# Patient Record
Sex: FEMALE | Race: AMERICAN INDIAN OR ALASKA NATIVE
[De-identification: names, ages, dates, MRNs, and addresses within clinical notes are randomized per-mention and may not be internally consistent; named-entity substitution may affect disease eponyms.]

---

## 2018-07-20 ENCOUNTER — HOSPITAL ENCOUNTER (OUTPATIENT)
Dept: HOSPITAL 5 - SLR | Age: 43
Discharge: HOME | End: 2018-07-20
Attending: OTOLARYNGOLOGY
Payer: MEDICAID

## 2018-07-20 DIAGNOSIS — E66.9: ICD-10-CM

## 2018-07-20 DIAGNOSIS — Z90.710: ICD-10-CM

## 2018-07-20 DIAGNOSIS — G47.33: Primary | ICD-10-CM

## 2018-07-20 DIAGNOSIS — E03.9: ICD-10-CM

## 2018-07-20 PROCEDURE — G0399 HOME SLEEP TEST/TYPE 3 PORTA: HCPCS

## 2020-08-06 ENCOUNTER — OFFICE VISIT (OUTPATIENT)
Dept: URBAN - METROPOLITAN AREA CLINIC 92 | Facility: CLINIC | Age: 45
End: 2020-08-06
Payer: COMMERCIAL

## 2020-08-06 VITALS
HEIGHT: 66 IN | BODY MASS INDEX: 29.96 KG/M2 | HEART RATE: 83 BPM | WEIGHT: 186.4 LBS | SYSTOLIC BLOOD PRESSURE: 120 MMHG | TEMPERATURE: 94.6 F | DIASTOLIC BLOOD PRESSURE: 83 MMHG

## 2020-08-06 DIAGNOSIS — R10.84 DIFFUSE ABDOMINAL PAIN: ICD-10-CM

## 2020-08-06 DIAGNOSIS — K58.1 IRRITABLE BOWEL SYNDROME WITH CONSTIPATION: ICD-10-CM

## 2020-08-06 PROBLEM — 440630006: Status: ACTIVE | Noted: 2020-08-06

## 2020-08-06 PROCEDURE — 99244 OFF/OP CNSLTJ NEW/EST MOD 40: CPT | Performed by: INTERNAL MEDICINE

## 2020-08-06 RX ORDER — LINACLOTIDE 290 UG/1
1 CAPSULE AT LEAST 30 MINUTES BEFORE THE FIRST MEAL OF THE DAY ON AN EMPTY STOMACH CAPSULE, GELATIN COATED ORAL ONCE A DAY
Qty: 30 | OUTPATIENT
Start: 2020-08-06 | End: 2020-09-05

## 2020-08-06 RX ORDER — LEVOTHYROXINE SODIUM 75 UG/1
TABLET ORAL
Qty: 90 UNSPECIFIED | Status: ACTIVE | COMMUNITY

## 2020-08-06 RX ORDER — CHOLECALCIFEROL (VITAMIN D3) 1250 MCG
CAPSULE ORAL
Qty: 12 UNSPECIFIED | Status: ACTIVE | COMMUNITY

## 2020-08-06 RX ORDER — HYDROCHLOROTHIAZIDE 12.5 MG/1
CAPSULE ORAL
Qty: 90 UNSPECIFIED | Status: ACTIVE | COMMUNITY

## 2020-08-06 RX ORDER — PHENTERMINE AND TOPIRAMATE 7.5; 46 MG/1; MG/1
CAPSULE, EXTENDED RELEASE ORAL
Qty: 30 UNSPECIFIED | Status: ACTIVE | COMMUNITY

## 2020-08-06 NOTE — HPI-TODAY'S VISIT:
Referred by Dr. Alexander. Abd pain, diffuse, started 7 months ago but progressive. Intermittent and lasts several hrs. Occurs several times per week. No increasing/decreasing factors. +GERD. No dysphagia, recent wt loss, NSAIDs or GI bleeding. Has chronic constipation and is on miralax daily. BMq3 days. Mild improvement in abd discomfort only. No recent EGD or previous colonoscopy. Is s/p sleeve gastrectomy 2015 and hiatal hernia repair in 2018.  States recent ER visit to [facility] [place] with normal labs and CT abd. No records.

## 2020-08-24 ENCOUNTER — TELEPHONE ENCOUNTER (OUTPATIENT)
Dept: URBAN - METROPOLITAN AREA CLINIC 92 | Facility: CLINIC | Age: 45
End: 2020-08-24

## 2020-08-27 ENCOUNTER — OFFICE VISIT (OUTPATIENT)
Dept: URBAN - METROPOLITAN AREA SURGERY CENTER 16 | Facility: SURGERY CENTER | Age: 45
End: 2020-08-27
Payer: COMMERCIAL

## 2020-08-27 ENCOUNTER — TELEPHONE ENCOUNTER (OUTPATIENT)
Dept: URBAN - METROPOLITAN AREA CLINIC 5 | Facility: CLINIC | Age: 45
End: 2020-08-27

## 2020-08-27 DIAGNOSIS — K29.60 ADENOPAPILLOMATOSIS GASTRICA: ICD-10-CM

## 2020-08-27 DIAGNOSIS — R10.84 ABDOMINAL CRAMPING, GENERALIZED: ICD-10-CM

## 2020-08-27 DIAGNOSIS — K44.9 DIAPHRAGMATIC HERNIA: ICD-10-CM

## 2020-08-27 PROCEDURE — G8907 PT DOC NO EVENTS ON DISCHARG: HCPCS | Performed by: INTERNAL MEDICINE

## 2020-08-27 PROCEDURE — 43239 EGD BIOPSY SINGLE/MULTIPLE: CPT | Performed by: INTERNAL MEDICINE

## 2020-08-27 PROCEDURE — G9937 DIG OR SURV COLSCO: HCPCS | Performed by: INTERNAL MEDICINE

## 2020-08-27 PROCEDURE — 45378 DIAGNOSTIC COLONOSCOPY: CPT | Performed by: INTERNAL MEDICINE

## 2020-08-27 RX ORDER — HYDROCHLOROTHIAZIDE 12.5 MG/1
CAPSULE ORAL
Qty: 90 UNSPECIFIED | Status: ACTIVE | COMMUNITY

## 2020-08-27 RX ORDER — LEVOTHYROXINE SODIUM 75 UG/1
TABLET ORAL
Qty: 90 UNSPECIFIED | Status: ACTIVE | COMMUNITY

## 2020-08-27 RX ORDER — OMEPRAZOLE 40 MG/1
AS DIRECTED CAPSULE, DELAYED RELEASE ORAL BID
Qty: 60 | Refills: 1 | OUTPATIENT
Start: 2020-08-27

## 2020-08-27 RX ORDER — PHENTERMINE AND TOPIRAMATE 7.5; 46 MG/1; MG/1
CAPSULE, EXTENDED RELEASE ORAL
Qty: 30 UNSPECIFIED | Status: ACTIVE | COMMUNITY

## 2020-08-27 RX ORDER — LINACLOTIDE 290 UG/1
1 CAPSULE AT LEAST 30 MINUTES BEFORE THE FIRST MEAL OF THE DAY ON AN EMPTY STOMACH CAPSULE, GELATIN COATED ORAL ONCE A DAY
Qty: 30 | Status: ACTIVE | COMMUNITY
Start: 2020-08-06 | End: 2020-09-05

## 2020-08-27 RX ORDER — CHOLECALCIFEROL (VITAMIN D3) 1250 MCG
CAPSULE ORAL
Qty: 12 UNSPECIFIED | Status: ACTIVE | COMMUNITY

## 2020-08-31 ENCOUNTER — TELEPHONE ENCOUNTER (OUTPATIENT)
Dept: URBAN - METROPOLITAN AREA CLINIC 92 | Facility: CLINIC | Age: 45
End: 2020-08-31

## 2020-09-30 ENCOUNTER — TELEPHONE ENCOUNTER (OUTPATIENT)
Dept: URBAN - METROPOLITAN AREA CLINIC 92 | Facility: CLINIC | Age: 45
End: 2020-09-30

## 2020-09-30 RX ORDER — OMEPRAZOLE 40 MG/1
1 CAPSULE 30 MINUTES BEFORE MORNING MEAL CAPSULE, DELAYED RELEASE ORAL ONCE A DAY
Qty: 90 | Refills: 0 | OUTPATIENT
Start: 2020-09-30

## 2020-10-05 ENCOUNTER — OFFICE VISIT (OUTPATIENT)
Dept: URBAN - METROPOLITAN AREA CLINIC 92 | Facility: CLINIC | Age: 45
End: 2020-10-05
Payer: COMMERCIAL

## 2020-10-05 ENCOUNTER — DASHBOARD ENCOUNTERS (OUTPATIENT)
Age: 45
End: 2020-10-05

## 2020-10-05 VITALS
BODY MASS INDEX: 31.27 KG/M2 | WEIGHT: 194.6 LBS | SYSTOLIC BLOOD PRESSURE: 138 MMHG | HEART RATE: 109 BPM | DIASTOLIC BLOOD PRESSURE: 83 MMHG | HEIGHT: 66 IN | TEMPERATURE: 95.1 F

## 2020-10-05 DIAGNOSIS — K28.9 ANASTOMOTIC ULCER S/P GASTRIC BYPASS: ICD-10-CM

## 2020-10-05 PROBLEM — 447408004: Status: ACTIVE | Noted: 2020-10-05

## 2020-10-05 PROCEDURE — 1036F TOBACCO NON-USER: CPT | Performed by: INTERNAL MEDICINE

## 2020-10-05 PROCEDURE — 99213 OFFICE O/P EST LOW 20 MIN: CPT | Performed by: INTERNAL MEDICINE

## 2020-10-05 PROCEDURE — G8417 CALC BMI ABV UP PARAM F/U: HCPCS | Performed by: INTERNAL MEDICINE

## 2020-10-05 PROCEDURE — G8427 DOCREV CUR MEDS BY ELIG CLIN: HCPCS | Performed by: INTERNAL MEDICINE

## 2020-10-05 RX ORDER — LEVOTHYROXINE SODIUM 75 UG/1
TABLET ORAL
Qty: 90 UNSPECIFIED | Status: ACTIVE | COMMUNITY

## 2020-10-05 RX ORDER — OMEPRAZOLE 40 MG/1
AS DIRECTED CAPSULE, DELAYED RELEASE ORAL BID
Qty: 60 | Refills: 0 | OUTPATIENT
Start: 2020-10-05

## 2020-10-05 RX ORDER — PHENTERMINE AND TOPIRAMATE 7.5; 46 MG/1; MG/1
CAPSULE, EXTENDED RELEASE ORAL
Qty: 30 UNSPECIFIED | Status: ACTIVE | COMMUNITY

## 2020-10-05 RX ORDER — CHOLECALCIFEROL (VITAMIN D3) 1250 MCG
CAPSULE ORAL
Qty: 12 UNSPECIFIED | Status: ACTIVE | COMMUNITY

## 2020-10-05 RX ORDER — OMEPRAZOLE 40 MG/1
1 CAPSULE 30 MINUTES BEFORE MORNING MEAL CAPSULE, DELAYED RELEASE ORAL ONCE A DAY
Qty: 90 | Refills: 0 | Status: ACTIVE | COMMUNITY
Start: 2020-09-30

## 2020-10-05 RX ORDER — OMEPRAZOLE 40 MG/1
AS DIRECTED CAPSULE, DELAYED RELEASE ORAL BID
Qty: 60 | Refills: 1 | Status: ACTIVE | COMMUNITY
Start: 2020-08-27

## 2020-10-05 RX ORDER — HYDROCHLOROTHIAZIDE 12.5 MG/1
CAPSULE ORAL
Qty: 90 UNSPECIFIED | Status: ACTIVE | COMMUNITY

## 2020-10-05 NOTE — HPI-TODAY'S VISIT:
Pt seen for follow up of abd pain. Recent EGD with ulcer/suture material at gastro-jejunal anastomosis. Bx neg for HP. Hx of marcial en y and nissen. Denies nsaids or tobacco. On ppi bid with improvement. No new symptoms. Colonoscopy 8/2020 normal.

## 2022-06-08 ENCOUNTER — HOSPITAL ENCOUNTER (OUTPATIENT)
Dept: HOSPITAL 5 - ED | Age: 47
Setting detail: OBSERVATION
LOS: 2 days | Discharge: HOME | End: 2022-06-10
Attending: INTERNAL MEDICINE | Admitting: INTERNAL MEDICINE
Payer: COMMERCIAL

## 2022-06-08 DIAGNOSIS — N19: ICD-10-CM

## 2022-06-08 DIAGNOSIS — Z98.890: ICD-10-CM

## 2022-06-08 DIAGNOSIS — G43.909: ICD-10-CM

## 2022-06-08 DIAGNOSIS — E03.9: ICD-10-CM

## 2022-06-08 DIAGNOSIS — Z79.899: ICD-10-CM

## 2022-06-08 DIAGNOSIS — M62.81: Primary | ICD-10-CM

## 2022-06-08 DIAGNOSIS — Z90.710: ICD-10-CM

## 2022-06-08 DIAGNOSIS — Z79.82: ICD-10-CM

## 2022-06-08 DIAGNOSIS — K21.9: ICD-10-CM

## 2022-06-08 DIAGNOSIS — R29.701: ICD-10-CM

## 2022-06-08 PROCEDURE — 84484 ASSAY OF TROPONIN QUANT: CPT

## 2022-06-08 PROCEDURE — 36415 COLL VENOUS BLD VENIPUNCTURE: CPT

## 2022-06-08 PROCEDURE — G0378 HOSPITAL OBSERVATION PER HR: HCPCS

## 2022-06-08 PROCEDURE — 99285 EMERGENCY DEPT VISIT HI MDM: CPT

## 2022-06-08 PROCEDURE — 97162 PT EVAL MOD COMPLEX 30 MIN: CPT

## 2022-06-08 PROCEDURE — 96372 THER/PROPH/DIAG INJ SC/IM: CPT

## 2022-06-08 PROCEDURE — 82550 ASSAY OF CK (CPK): CPT

## 2022-06-08 PROCEDURE — 82553 CREATINE MB FRACTION: CPT

## 2022-06-08 PROCEDURE — 70553 MRI BRAIN STEM W/O & W/DYE: CPT

## 2022-06-08 PROCEDURE — 84702 CHORIONIC GONADOTROPIN TEST: CPT

## 2022-06-08 PROCEDURE — 92610 EVALUATE SWALLOWING FUNCTION: CPT

## 2022-06-08 PROCEDURE — 93005 ELECTROCARDIOGRAM TRACING: CPT

## 2022-06-08 PROCEDURE — 93306 TTE W/DOPPLER COMPLETE: CPT

## 2022-06-08 PROCEDURE — 96375 TX/PRO/DX INJ NEW DRUG ADDON: CPT

## 2022-06-08 PROCEDURE — 85025 COMPLETE CBC W/AUTO DIFF WBC: CPT

## 2022-06-08 PROCEDURE — 85670 THROMBIN TIME PLASMA: CPT

## 2022-06-08 PROCEDURE — 97165 OT EVAL LOW COMPLEX 30 MIN: CPT

## 2022-06-08 PROCEDURE — 85730 THROMBOPLASTIN TIME PARTIAL: CPT

## 2022-06-08 PROCEDURE — 70450 CT HEAD/BRAIN W/O DYE: CPT

## 2022-06-08 PROCEDURE — 70544 MR ANGIOGRAPHY HEAD W/O DYE: CPT

## 2022-06-08 PROCEDURE — 96374 THER/PROPH/DIAG INJ IV PUSH: CPT

## 2022-06-08 PROCEDURE — 80053 COMPREHEN METABOLIC PANEL: CPT

## 2022-06-08 PROCEDURE — 93880 EXTRACRANIAL BILAT STUDY: CPT

## 2022-06-08 PROCEDURE — 82962 GLUCOSE BLOOD TEST: CPT

## 2022-06-08 PROCEDURE — A9575 INJ GADOTERATE MEGLUMI 0.1ML: HCPCS

## 2022-06-08 PROCEDURE — 85610 PROTHROMBIN TIME: CPT

## 2022-06-08 PROCEDURE — C8929 TTE W OR WO FOL WCON,DOPPLER: HCPCS

## 2022-06-08 PROCEDURE — 70549 MR ANGIOGRAPH NECK W/O&W/DYE: CPT
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
## 2022-06-10 VITALS — DIASTOLIC BLOOD PRESSURE: 72 MMHG | SYSTOLIC BLOOD PRESSURE: 106 MMHG

## 2022-06-10 LAB
ALBUMIN SERPL-MCNC: 4.3 G/DL (ref 3.9–5)
ALT SERPL-CCNC: 23 UNITS/L (ref 7–56)
APTT BLD: 32.6 SEC. (ref 24.2–36.6)
BASOPHILS # (AUTO): 0.1 K/MM3 (ref 0–0.1)
BASOPHILS NFR BLD AUTO: 1.2 % (ref 0–1.8)
BUN SERPL-MCNC: 10 MG/DL (ref 7–17)
BUN/CREAT SERPL: 17 %
CALCIUM SERPL-MCNC: 9.2 MG/DL (ref 8.4–10.2)
EOSINOPHIL # BLD AUTO: 0.1 K/MM3 (ref 0–0.4)
EOSINOPHIL NFR BLD AUTO: 1.3 % (ref 0–4.3)
HCT VFR BLD CALC: 38.9 % (ref 30.3–42.9)
HEMOLYSIS INDEX: 7
HGB BLD-MCNC: 12.8 GM/DL (ref 10.1–14.3)
INR PPP: 0.87 (ref 0.87–1.13)
LYMPHOCYTES # BLD AUTO: 2.9 K/MM3 (ref 1.2–5.4)
LYMPHOCYTES NFR BLD AUTO: 37.3 % (ref 13.4–35)
MCHC RBC AUTO-ENTMCNC: 33 % (ref 30–34)
MCV RBC AUTO: 83 FL (ref 79–97)
MONOCYTES # (AUTO): 0.6 K/MM3 (ref 0–0.8)
MONOCYTES % (AUTO): 8.2 % (ref 0–7.3)
PLATELET # BLD: 416 K/MM3 (ref 140–440)
RBC # BLD AUTO: 4.68 M/MM3 (ref 3.65–5.03)

## 2022-06-10 RX ADMIN — HEPARIN SODIUM SCH UNIT: 5000 INJECTION, SOLUTION INTRAVENOUS; SUBCUTANEOUS at 05:30

## 2022-06-10 RX ADMIN — HEPARIN SODIUM SCH UNIT: 5000 INJECTION, SOLUTION INTRAVENOUS; SUBCUTANEOUS at 14:21

## 2022-06-10 NOTE — ELECTROCARDIOGRAPH REPORT
[facility]

                                       

Test Date:    2022-06-10               Test Time:    00:12:58

Pat Name:     ARTURO MUNIZ           Department:   

Patient ID:   [id number]          Room:         A469 1

Gender:       F                        Technician:   KEVIN

:          1975               Requested By: SHARONA CARUSO

Order Number: [id number]              Reading MD:   Halina Carrera

                                 Measurements

Intervals                              Axis          

Rate:         69                       P:            64

OR:           134                      QRS:          18

QRSD:         79                       T:            55

QT:           418                                    

QTc:          448                                    

                           Interpretive Statements

Sinus rhythm

Poor R wave progress

No previous ECG available for comparison

Electronically Signed On 6- 13:32:24 EDT by Halina Carrera

## 2022-06-10 NOTE — VASCULAR LAB REPORT
DUPLEX DOPPLER ULTRASOUND CAROTID, BILATERAL



INDICATION / CLINICAL INFORMATION: stroke.



COMPARISON: MRA/MRV neck performed today.



FINDINGS:



RIGHT CAROTID: No significant atherosclerotic plaque.

- PLAQUE ESTIMATE (%): None.

- CCA velocity: 80 cm/sec.

- ICA peak systolic velocity: 119 cm/sec.

- ICA/CCA PSV Ratio: Less than 2.



Right Vertebral Artery: Antegrade flow.



LEFT CAROTID: No significant atherosclerotic plaque.

- PLAQUE ESTIMATE (%): None.

- CCA velocity: 107 cm/sec.

- ICA peak systolic velocity: 121 cm/sec.

- ICA/CCA PSV Ratio: Less than 2.



Left Vertebral Artery: Antegrade flow.



IMPRESSION:

1. Right Internal Carotid Artery: Normal. No stenosis.

2. Left Internal Carotid Artery: Normal. No stenosis.







Velocity criteria are extrapolated from diameter data as defined by the Society of Radiologists in Ul
trasound Consensus Conference, Radiology 2003; 229;340-346.

=======================================================

NO STENOSIS (NORMAL)

     - Plaque = none; ICA PSV < 125 cm/sec; ICA/CCA PSV Ratio < 2.0

<50% STENOSIS

     - Plaque < 50%; ICA PSV < 125 cm/sec; ICA/CCA PSV Ratio < 2.0

50-69% STENOSIS

     - Plaque > 50%; ICA PSV = 125-230 cm/sec; ICA/CCA PSV Ratio = 2.0-4.0

>70% BUT <100% STENOSIS

     - Plaque > 50%; ICA PSV > 230 cm/sec; ICA/CCA PSV Ratio > 4.0

NEAR OCCLUSION

     - Plaque = visible lumen; ICA PSV = high/low/none; ICA/CCA PSV Ratio = variable

TOTAL OCCLUSION

     - Plaque = no lumen; ICA PSV = none; ICA/CCA PSV Ratio = N/A

======================================================



Scribed by: Yadira Santana RDMS, RVT, RMSKS 

Scribed: 6/10/2022 12:15 PM 



 I have reviewed the images, agree with this report, and edited this report as needed.



Signer Name: Tony Mcmillan MD 

Signed: 6/10/2022 2:20 PM

Workstation Name: [id number]
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
## 2022-06-10 NOTE — HISTORY AND PHYSICAL REPORT
History of Present Illness


Date of examination: 06/10/22


Date of admission: 


06/10/2022


Chief complaint: 





Right Sided Weakness


History of present illness: 





47-year-old -American female presenting to the emergency room today 

complaining of headache, blurry vision and right-sided weakness which started 

about 7 PM yesterday.  Blurring of vision has been ongoing for the past few days

but got worse yesterday.  She denies any fever or chills, no neck pain, no nause

a vomiting, no abdominal pain and no diarrhea.


Headache is said to be global, no known relieving or exacerbating factor.





Patient denies any fall, denies any difficulty with swallowing and no difficulty

with speech.  She denies any facial numbness or asymmetry.





Work-up in the emergency room today, labs were unremarkable.


CT scan of the head shows microangiopathy with deep white matter ischemic lesion

left posterior parietal lobe.  No intracranial bleed or large territorial 

infarction.





Patient is being admitted for CVA work-up.





Past History


Past Medical History: hypothyroidism, renal failure (Has only one functioning 

Kidney)


Past Surgical History: hysterectomy (Partial), Other ((VISHAL. BREAST REDUCTION IN 

2013, EXCISION BREAST BX 9-28-15))


Social history: alcohol abuse (Occasional Alcohol )


Family history: no significant family history





Medications and Allergies


                                    Allergies











Allergy/AdvReac Type Severity Reaction Status Date / Time


 


No Known Allergies Allergy   Verified 10/23/15 04:22











                                Home Medications











 Medication  Instructions  Recorded  Confirmed  Last Taken  Type


 


Cholecalciferol (Vitamin D3) 5,000 unit PO DAILY 09/24/15 02/15/16 02/14/16 

07:00 History





[Vitamin D]     


 


Levothyroxine [Synthroid] 25 mcg PO QAM 09/24/15 02/15/16 02/14/16 07:00 History


 


Cyclobenzaprine [Flexeril] 10 mg PO TID PRN #14 tablet 03/06/17  Unknown Rx


 


Ketorolac [Toradol] 10 mg PO Q6H PRN #20 tablet 03/06/17  Unknown Rx











Active Meds: 


Active Medications





Acetaminophen (Acetaminophen 325 Mg Tab)  650 mg PO Q4H PRN


   PRN Reason: Pain MILD(1-3)/Fever >100.5/HA


Aspirin (Aspirin 325 Mg Tab)  325 mg PO QDAY BRISSA


Atorvastatin Calcium (Atorvastatin 40 Mg Tab)  40 mg PO QHS BRISSA


Bisacodyl (Bisacodyl 10 Mg Rect Supp)  10 mg TN QDAY PRN


   PRN Reason: Constipation


Heparin Sodium (Porcine) (Heparin 5,000 Unit/1 Ml Vial)  5,000 unit SUB-Q Q8HR 

BRISSA


Magnesium Hydroxide (Magnesium Hydroxide (Mom) Oral Liqd Udc)  30 ml PO Q4H PRN


   PRN Reason: Constipation


Magnesium Hydroxide (Magnesium Hydroxide (Mom) Oral Liqd Udc)  30 ml PO Q4H PRN


   PRN Reason: Constipation


Metoclopramide HCl (Metoclopramide 10 Mg Tab)  10 mg PO Q6H PRN


   PRN Reason: Nausea And Vomiting


Morphine Sulfate (Morphine 2 Mg/1 Ml Inj)  2 mg IV Q4H PRN


   PRN Reason: Pain, Moderate (4-6)


Morphine Sulfate (Morphine 4 Mg/1 Ml Inj)  4 mg IV Q4H PRN


   PRN Reason: Pain , Severe (7-10)


Morphine Sulfate (Morphine 2 Mg/1 Ml Inj)  2 mg IV Q4H PRN


   PRN Reason: Pain, Moderate (4-6)


Morphine Sulfate (Morphine 4 Mg/1 Ml Inj)  4 mg IV Q4H PRN


   PRN Reason: Pain , Severe (7-10)


Ondansetron HCl (Ondansetron 4 Mg/2 Ml Inj)  4 mg IV Q8H PRN


   PRN Reason: Nausea And Vomiting


Ondansetron HCl (Ondansetron 4 Mg/2 Ml Inj)  4 mg IV Q8H PRN


   PRN Reason: Nausea And Vomiting


Promethazine HCl (Promethazine 25 Mg Rect Supp)  25 mg TN Q6H PRN


   PRN Reason: Nausea And Vomiting


Sodium Chloride (Sodium Chloride 0.9% 10 Ml Flush Syringe)  10 ml IV BID BRISSA


Sodium Chloride (Sodium Chloride 0.9% 10 Ml Flush Syringe)  10 ml IV PRN PRN


   PRN Reason: LINE FLUSH


Sodium Chloride (Sodium Chloride 0.9% 10 Ml Flush Syringe)  10 ml INJ PRN PRN


   PRN Reason: LINE FLUSH











Review of Systems


Constitutional: no fever, no chills


Ears, nose, mouth and throat: no nasal congestion, no sore throat


Cardiovascular: no chest pain, no palpitations


Respiratory: no cough, no shortness of breath


Gastrointestinal: no abdominal pain, no nausea, no vomiting, no diarrhea


Genitourinary Female: no pelvic pain, no flank pain, no dysuria, no hematuria


Musculoskeletal: no neck pain, no low back pain


Integumentary: no rash, no pruritis


Neurological: weakness (Right sided weakness), headaches, no confusion


Psychiatric: no anxiety, no depression


Endocrine: no polyphagia, no polydipsia, no polyuria, no nocturia





Exam





- Constitutional


Vitals: 


                                        











Temp Pulse Resp BP Pulse Ox


 


 98.0 F   75   19   142/85   100 


 


 06/09/22 00:51  06/09/22 23:57  06/09/22 23:57  06/09/22 23:57  06/09/22 23:57











General appearance: Present: no acute distress, well-nourished





- EENT


Eyes: Present: PERRL, EOM intact.  Absent: scleral icterus


ENT: hearing intact, clear oral mucosa, dentition normal





- Neck


Neck: Present: supple, normal ROM





- Respiratory


Respiratory effort: normal


Respiratory: bilateral: CTA





- Cardiovascular


Rhythm: regular


Heart Sounds: Present: S1 & S2.  Absent: gallop, systolic murmur, diastolic 

murmur, rub, click





- Extremities


Extremities: no ischemia, pulses intact, pulses symmetrical, No edema, normal 

temperature, Full ROM


Peripheral Pulses: within normal limits





- Abdominal


General gastrointestinal: Present: soft, non-tender, non-distended, normal bowel

 sounds.  Absent: mass





- Integumentary


Integumentary: Present: clear, warm, dry, normal turgor.  Absent: rash





- Musculoskeletal


Musculoskeletal: right sided weakness





- Psychiatric


Psychiatric: appropriate mood/affect, intact judgment & insight, memory intact, 

cooperative





- Neurologic


Neurologic: CNII-XII intact, moves all extremities, other (Right upper and right

 lower extremity weakness)





HEART Score





- HEART Score


Troponin: 


                                        











Troponin T  < 0.010 ng/mL (0.00-0.029)   06/09/22  23:49    














Results





- Labs


CBC & Chem 7: 


                                 06/09/22 23:49





                                 06/09/22 23:49


Labs: 


                              Abnormal lab results











  06/09/22 06/09/22 Range/Units





  23:49 23:49 


 


MCH  27 L   (28-32)  pg


 


Lymph % (Auto)  37.3 H   (13.4-35.0)  %


 


Mono % (Auto)  8.2 H   (0.0-7.3)  %


 


Glucose   101 H  ()  mg/dL


 


Alkaline Phosphatase   132 H  ()  units/L


 


Total Creatine Kinase   235 H  ()  units/L














Assessment and Plan





Assessment:





1.  Right-sided weakness- R/O CVA





2.  History of hypothyroidism





3.  History of GERD





4.  Headache





Plan:





1.  Patient admitted and placed on telemetry.  Placed on daily aspirin and 

statin.





2.  We will schedule for carotid Doppler, echocardiogram and MRI of the brain.





3.  Consult placed to neurology for evaluation and recommendation.





4.  We will resume routine home medications once reconciled.





5.  We will consult PT /OT for evaluation and recommendations.











DVT prophylaxis: Subcutaneous heparin








CODE STATUS: Full code

## 2022-06-10 NOTE — MAGNETIC RESONANCE REPORT
MRA NECK 6/10/2022



INDICATION / CLINICAL INFORMATION:

Stroke, DIZZINESS, LOSS OF VISION.



TECHNIQUE: 

Routine  MRA of the neck are performed. 3-D/MIP reformats postprocessed. Percentage stenosis is deter
mined by direct quantitative measurements of distal internal carotid artery diameter compared with no
rmal reference segments or by criteria similar to NASCET where applicable.



COMPARISON: 

None available.





FINDINGS: 

Unenhanced and enhanced MR angiographic images of the neck were obtained. 3D/MIP reformats were post-
processed. Postcontrast images are of limited quality due to difficulties with bolus timing.



Carotid bifurcations: No evidence of abnormality.

Common carotid arteries: No significant abnormality.

Cervical internal carotid arteries: No significant abnormality.

Cervical vertebral arteries: No significant abnormality. 

Visible aortic arch: Not well seen







IMPRESSION:

 No significant abnormality.



Signer Name: Josef Cruz MD 

Signed: 6/10/2022 11:05 AM

Workstation Name: [id number]

## 2022-06-10 NOTE — CAT SCAN REPORT
CT HEAD WITHOUT CONTRAST



INDICATION / CLINICAL INFORMATION:

HA, right sided weakness  numbness.



TECHNIQUE:

All CT scans at this location are performed using CT dose reduction for ALARA by means of automated e
xposure control. 



COMPARISON:

None available.



FINDINGS:

HEMORRHAGE: None.

EXTRA-AXIAL SPACES: Normal in size and morphology for the patient's age.

VENTRICULAR SYSTEM: Normal in size and morphology for the patient's age.

CEREBRAL PARENCHYMA: Microangiopathy bifrontal region. 1.9 cm hypodense ischemic lesion deep white ma
tter left posterior parietal lobe image 21. No large territorial infarction

MIDLINE SHIFT OR HERNIATION: None.

CEREBELLUM / BRAINSTEM: No significant abnormality.



ORBITS: Normal as visualized.

SOFT TISSUES of HEAD: No significant abnormality.

CALVARIUM: No significant abnormality.

PARANASAL SINUSES / MASTOID AIR CELLS: Normal as visualized.



ADDITIONAL FINDINGS: None.



IMPRESSION:

1. Microangiopathy with deep white matter ischemic lesion left posterior parietal lobe..

2. No intracranial bleed or large territorial infarction



Signer Name: Jose J Mendieta MD 

Signed: 6/10/2022 12:35 AM

Workstation Name: [id number]

## 2022-06-10 NOTE — MAGNETIC RESONANCE REPORT
MR VENOGRAM BRAIN 6/10/2022



HISTORY: Venous Sinus Thrombosis / Stroke workup.



FINDINGS: MR venographic images of the intracranial circulation were obtained.



There is good flow signal in the major dural sinuses. The left transverse and sigmoid sinus are relat
ively hypoplastic, in a pattern consistent with anatomic variation.







IMPRESSION: No significant abnormality.













All CT scans at this location are performed using dose reduction to ALARA by means of automated expos
ure control.



Signer Name: Josef Cruz MD 

Signed: 6/10/2022 11:06 AM

Workstation Name: [id number]

## 2022-06-10 NOTE — MAGNETIC RESONANCE REPORT
MRI BRAIN 6/10/2022



INDICATION / CLINICAL INFORMATION:

Stroke vs. MS, DIZZINESS, LOSS OF VISION.



TECHNIQUE: 

Multiplanar, multisequence MR images of the brain were obtained.



COMPARISON: 

None available.



FINDINGS:



BRAIN / INTRACRANIAL CONTENTS: Unenhanced and enhanced MR images of the brain were obtained.



There is no evidence of acute abnormality.



Ventricles and sulci are normal in size and shape.



Diffusely prominent perivascular spaces are present in the cerebral cortex. I would consider this to 
be somewhat unusual anatomic variant.



There is no evidence of ischemic injury, demyelination, hemorrhage, or mass. There are no abnormal ex
tra-axial fluid collections.



Postcontrast images demonstrate no abnormal contrast enhancement.







EXTRACRANIAL: Unremarkable



CRANIOCERVICAL JUNCTION: No significant abnormality.



VASCULAR FLOW-VOIDS: No significant abnormality.









IMPRESSION:

 

1. No acute abnormality.

2. Diffusely prominent perivascular space in the cerebral cortices



No evidence of acute ischemic injury or demyelination. 



Signer Name: Josef Cruz MD 

Signed: 6/10/2022 11:03 AM

Workstation Name: [id number]
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
## 2024-02-02 NOTE — PHYSICAL EXAM EYES:
responded to request to assist patient in completing the HCPOA form. Patient was alert and was able to answer questions appropriately. Form was completed and copy was placed in medical chart.    Chayo Kunz [address]  [id number]   Conjuntivae and eyelids appear normal, Sclerae : White without injection